# Patient Record
Sex: MALE | Race: BLACK OR AFRICAN AMERICAN | Employment: FULL TIME | URBAN - METROPOLITAN AREA
[De-identification: names, ages, dates, MRNs, and addresses within clinical notes are randomized per-mention and may not be internally consistent; named-entity substitution may affect disease eponyms.]

---

## 2019-04-18 ENCOUNTER — HOSPITAL ENCOUNTER (OUTPATIENT)
Age: 39
Discharge: HOME | End: 2019-04-18
Payer: COMMERCIAL

## 2019-04-18 ENCOUNTER — HOSPITAL ENCOUNTER (OUTPATIENT)
Dept: GENERAL RADIOLOGY | Age: 39
Discharge: HOME | End: 2019-04-18
Payer: COMMERCIAL

## 2019-04-18 DIAGNOSIS — M24.9 DERANGEMENT OF RIGHT SHOULDER JOINT: ICD-10-CM

## 2019-04-18 PROCEDURE — 73030 X-RAY EXAM OF SHOULDER: CPT

## 2022-07-25 ENCOUNTER — OFFICE VISIT (OUTPATIENT)
Dept: FAMILY MEDICINE CLINIC | Age: 42
End: 2022-07-25
Payer: COMMERCIAL

## 2022-07-25 VITALS
BODY MASS INDEX: 37.13 KG/M2 | DIASTOLIC BLOOD PRESSURE: 123 MMHG | HEART RATE: 80 BPM | WEIGHT: 245 LBS | HEIGHT: 68 IN | SYSTOLIC BLOOD PRESSURE: 192 MMHG

## 2022-07-25 DIAGNOSIS — I10 PRIMARY HYPERTENSION: ICD-10-CM

## 2022-07-25 DIAGNOSIS — Z00.00 ANNUAL PHYSICAL EXAM: Primary | ICD-10-CM

## 2022-07-25 DIAGNOSIS — Z23 NEED FOR VACCINATION: ICD-10-CM

## 2022-07-25 LAB
A/G RATIO: 1.6 (ref 1.1–2.2)
ALBUMIN SERPL-MCNC: 4.7 G/DL (ref 3.4–5)
ALP BLD-CCNC: 55 U/L (ref 40–129)
ALT SERPL-CCNC: 33 U/L (ref 10–40)
ANION GAP SERPL CALCULATED.3IONS-SCNC: 14 MMOL/L (ref 3–16)
AST SERPL-CCNC: 24 U/L (ref 15–37)
BASOPHILS ABSOLUTE: 0 K/UL (ref 0–0.2)
BASOPHILS RELATIVE PERCENT: 0.9 %
BILIRUB SERPL-MCNC: 0.6 MG/DL (ref 0–1)
BUN BLDV-MCNC: 12 MG/DL (ref 7–20)
CALCIUM SERPL-MCNC: 10 MG/DL (ref 8.3–10.6)
CHLORIDE BLD-SCNC: 104 MMOL/L (ref 99–110)
CHOLESTEROL, FASTING: 207 MG/DL (ref 0–199)
CO2: 24 MMOL/L (ref 21–32)
CREAT SERPL-MCNC: 0.8 MG/DL (ref 0.9–1.3)
EOSINOPHILS ABSOLUTE: 0.1 K/UL (ref 0–0.6)
EOSINOPHILS RELATIVE PERCENT: 1.1 %
GFR AFRICAN AMERICAN: >60
GFR NON-AFRICAN AMERICAN: >60
GLUCOSE BLD-MCNC: 101 MG/DL (ref 70–99)
HCT VFR BLD CALC: 42.8 % (ref 40.5–52.5)
HDLC SERPL-MCNC: 49 MG/DL (ref 40–60)
HEMOGLOBIN: 14.4 G/DL (ref 13.5–17.5)
HEPATITIS C ANTIBODY INTERPRETATION: NORMAL
LDL CHOLESTEROL CALCULATED: 113 MG/DL
LYMPHOCYTES ABSOLUTE: 1.6 K/UL (ref 1–5.1)
LYMPHOCYTES RELATIVE PERCENT: 33.5 %
MCH RBC QN AUTO: 28 PG (ref 26–34)
MCHC RBC AUTO-ENTMCNC: 33.7 G/DL (ref 31–36)
MCV RBC AUTO: 83 FL (ref 80–100)
MONOCYTES ABSOLUTE: 0.4 K/UL (ref 0–1.3)
MONOCYTES RELATIVE PERCENT: 7.7 %
NEUTROPHILS ABSOLUTE: 2.8 K/UL (ref 1.7–7.7)
NEUTROPHILS RELATIVE PERCENT: 56.8 %
PDW BLD-RTO: 14.5 % (ref 12.4–15.4)
PLATELET # BLD: 212 K/UL (ref 135–450)
PMV BLD AUTO: 9.6 FL (ref 5–10.5)
POTASSIUM SERPL-SCNC: 4.3 MMOL/L (ref 3.5–5.1)
RBC # BLD: 5.16 M/UL (ref 4.2–5.9)
SODIUM BLD-SCNC: 142 MMOL/L (ref 136–145)
TOTAL PROTEIN: 7.6 G/DL (ref 6.4–8.2)
TRIGLYCERIDE, FASTING: 227 MG/DL (ref 0–150)
TSH REFLEX: 0.85 UIU/ML (ref 0.27–4.2)
VLDLC SERPL CALC-MCNC: 45 MG/DL
WBC # BLD: 4.9 K/UL (ref 4–11)

## 2022-07-25 PROCEDURE — 90471 IMMUNIZATION ADMIN: CPT | Performed by: FAMILY MEDICINE

## 2022-07-25 PROCEDURE — 36415 COLL VENOUS BLD VENIPUNCTURE: CPT | Performed by: FAMILY MEDICINE

## 2022-07-25 PROCEDURE — 99386 PREV VISIT NEW AGE 40-64: CPT | Performed by: FAMILY MEDICINE

## 2022-07-25 PROCEDURE — 90715 TDAP VACCINE 7 YRS/> IM: CPT | Performed by: FAMILY MEDICINE

## 2022-07-25 RX ORDER — CHLORTHALIDONE 50 MG/1
50 TABLET ORAL DAILY
Qty: 30 TABLET | Refills: 3 | Status: SHIPPED | OUTPATIENT
Start: 2022-07-25 | End: 2022-08-22 | Stop reason: SDUPTHER

## 2022-07-25 SDOH — HEALTH STABILITY: PHYSICAL HEALTH: ON AVERAGE, HOW MANY MINUTES DO YOU ENGAGE IN EXERCISE AT THIS LEVEL?: 50 MIN

## 2022-07-25 SDOH — ECONOMIC STABILITY: FOOD INSECURITY: WITHIN THE PAST 12 MONTHS, THE FOOD YOU BOUGHT JUST DIDN'T LAST AND YOU DIDN'T HAVE MONEY TO GET MORE.: NEVER TRUE

## 2022-07-25 SDOH — HEALTH STABILITY: PHYSICAL HEALTH: ON AVERAGE, HOW MANY DAYS PER WEEK DO YOU ENGAGE IN MODERATE TO STRENUOUS EXERCISE (LIKE A BRISK WALK)?: 4 DAYS

## 2022-07-25 SDOH — ECONOMIC STABILITY: FOOD INSECURITY: WITHIN THE PAST 12 MONTHS, YOU WORRIED THAT YOUR FOOD WOULD RUN OUT BEFORE YOU GOT MONEY TO BUY MORE.: NEVER TRUE

## 2022-07-25 ASSESSMENT — PATIENT HEALTH QUESTIONNAIRE - PHQ9
SUM OF ALL RESPONSES TO PHQ QUESTIONS 1-9: 0
SUM OF ALL RESPONSES TO PHQ QUESTIONS 1-9: 0
2. FEELING DOWN, DEPRESSED OR HOPELESS: 0
SUM OF ALL RESPONSES TO PHQ QUESTIONS 1-9: 0
SUM OF ALL RESPONSES TO PHQ QUESTIONS 1-9: 0
1. LITTLE INTEREST OR PLEASURE IN DOING THINGS: 0
SUM OF ALL RESPONSES TO PHQ9 QUESTIONS 1 & 2: 0

## 2022-07-25 ASSESSMENT — SOCIAL DETERMINANTS OF HEALTH (SDOH)
WITHIN THE LAST YEAR, HAVE YOU BEEN KICKED, HIT, SLAPPED, OR OTHERWISE PHYSICALLY HURT BY YOUR PARTNER OR EX-PARTNER?: NO
WITHIN THE LAST YEAR, HAVE YOU BEEN AFRAID OF YOUR PARTNER OR EX-PARTNER?: NO
WITHIN THE LAST YEAR, HAVE TO BEEN RAPED OR FORCED TO HAVE ANY KIND OF SEXUAL ACTIVITY BY YOUR PARTNER OR EX-PARTNER?: NO
HOW HARD IS IT FOR YOU TO PAY FOR THE VERY BASICS LIKE FOOD, HOUSING, MEDICAL CARE, AND HEATING?: NOT HARD AT ALL
WITHIN THE LAST YEAR, HAVE YOU BEEN HUMILIATED OR EMOTIONALLY ABUSED IN OTHER WAYS BY YOUR PARTNER OR EX-PARTNER?: NO

## 2022-07-25 NOTE — PROGRESS NOTES
annual exam.  Update Tdap today. Check routine labs. Pneumovax up-to-date. Patient has hypertension. Blood pressure is above goal.  We will add chlorthalidone. Follow-up in 1 week for blood pressure check. No sign of ACS or endorgan damage at this time. Discussed with patient medication/s dosage, instructions, potential S/E, A/R and Drug Interaction  Tylenol or Motrin as needed for pain or fever  Advise to return here if worse or go to nearest ER  Encourage fluids  Pt discharged in stable condition. 1. Annual physical exam  -     CBC with Auto Differential; Future  -     Comprehensive Metabolic Panel; Future  -     Hemoglobin A1C; Future  -     Hepatitis C Antibody; Future  -     HIV Screen; Future  -     Lipid, Fasting; Future  -     TSH with Reflex; Future  2. Primary hypertension  -     chlorthalidone (HYGROTEN) 50 MG tablet; Take 1 tablet by mouth in the morning., Disp-30 tablet, R-3Normal     Orders Placed This Encounter   Procedures    CBC with Auto Differential     Standing Status:   Future     Standing Expiration Date:   7/25/2023    Comprehensive Metabolic Panel     Standing Status:   Future     Standing Expiration Date:   7/25/2023    Hemoglobin A1C     Standing Status:   Future     Standing Expiration Date:   7/25/2023    Hepatitis C Antibody     Standing Status:   Future     Standing Expiration Date:   7/25/2023    HIV Screen     Standing Status:   Future     Standing Expiration Date:   7/25/2023    Lipid, Fasting     Standing Status:   Future     Standing Expiration Date:   7/25/2023    TSH with Reflex     Standing Status:   Future     Standing Expiration Date:   7/25/2023       Return in about 1 week (around 8/1/2022) for blood pressure check.     Mario Garcia MD, MD    7/25/2022  4:26 PM

## 2022-07-26 LAB
ESTIMATED AVERAGE GLUCOSE: 119.8 MG/DL
HBA1C MFR BLD: 5.8 %
HIV AG/AB: NORMAL
HIV ANTIGEN: NORMAL
HIV-1 ANTIBODY: NORMAL
HIV-2 AB: NORMAL

## 2022-08-08 ENCOUNTER — OFFICE VISIT (OUTPATIENT)
Dept: FAMILY MEDICINE CLINIC | Age: 42
End: 2022-08-08
Payer: COMMERCIAL

## 2022-08-08 VITALS
DIASTOLIC BLOOD PRESSURE: 98 MMHG | HEIGHT: 68 IN | SYSTOLIC BLOOD PRESSURE: 152 MMHG | WEIGHT: 256 LBS | HEART RATE: 86 BPM | OXYGEN SATURATION: 98 % | BODY MASS INDEX: 38.8 KG/M2

## 2022-08-08 DIAGNOSIS — I10 PRIMARY HYPERTENSION: Primary | ICD-10-CM

## 2022-08-08 PROCEDURE — 99214 OFFICE O/P EST MOD 30 MIN: CPT | Performed by: FAMILY MEDICINE

## 2022-08-08 RX ORDER — LISINOPRIL 20 MG/1
20 TABLET ORAL DAILY
Qty: 90 TABLET | Refills: 1 | Status: SHIPPED | OUTPATIENT
Start: 2022-08-08 | End: 2022-08-22

## 2022-08-08 RX ORDER — HYDROCHLOROTHIAZIDE 25 MG/1
25 TABLET ORAL EVERY MORNING
Qty: 90 TABLET | Refills: 1 | Status: CANCELLED | OUTPATIENT
Start: 2022-08-08

## 2022-08-08 NOTE — PROGRESS NOTES
[address] Note    Date: 8/8/2022                                               Jennifer Quiet:     Chief Complaint   Patient presents with    Hypertension       HPI  Patient is here for blood pressure check. Currently takes chlorthalidone. Denies chest pain or shortness of breath. Patient Active Problem List    Diagnosis Date Noted    Hypertension 01/22/2013       Past Medical History:   Diagnosis Date    Hypertension 1/22/2013       Current Outpatient Medications   Medication Sig Dispense Refill    lisinopril (PRINIVIL;ZESTRIL) 20 MG tablet Take 1 tablet by mouth in the morning. 90 tablet 1    chlorthalidone (HYGROTEN) 50 MG tablet Take 1 tablet by mouth in the morning. 30 tablet 3     No current facility-administered medications for this visit. No Known Allergies    Review of Systems  No fever, no cough    Vitals:  BP (!) 152/98   Pulse 86   Ht 5' 8\" (1.727 m)   Wt 256 lb (116.1 kg)   SpO2 98%   BMI 38.92 kg/m²     Wt Readings from Last 3 Encounters:   08/08/22 256 lb (116.1 kg)   07/25/22 245 lb (111.1 kg)   01/22/13 224 lb (101.6 kg)        Physical Exam  General:  Well-appearing, NAD, alert, non-toxic  HEENT:  Normocephalic, atraumatic. Pupils equal and round. CHEST/LUNGS: CTAB, no crackles, no wheeze, no rhonchi. Symmetric rise  CARDIOVASCULAR: RRR,  no murmur, no rub  EXTREMETIES: Normal movement of all extremities  SKIN:  No rash, no cellulitis, no bruising, no petechiae/purpura/vesicles/pustules/abscess  PSYCH:  A+O x 3; normal affect  NEURO:  GCS 15, CN2-12 grossly intact, no focal motor/sensory deficits, no cerebellar deficits, normal gait, normal speech      Assessment/Plan     79-year-old male with hypertension. Blood pressures improved, but still above goal.  We will add lisinopril. Follow-up in 2 weeks for blood pressure check. Continue chlorthalidone.     Discussed with patient medication/s dosage, instructions, potential S/E, A/R and Drug Interaction  Tylenol or Motrin as needed for pain or fever  Advise to return here if worse or go to nearest ER  Encourage fluids  Pt discharged in stable condition at 16:37      1. Primary hypertension  -     lisinopril (PRINIVIL;ZESTRIL) 20 MG tablet; Take 1 tablet by mouth in the morning., Disp-90 tablet, R-1Normal     No orders of the defined types were placed in this encounter. Return in about 2 weeks (around 8/22/2022) for blood pressure check.     Conner Mccormick MD, MD    8/8/2022  4:37 PM

## 2022-08-22 ENCOUNTER — OFFICE VISIT (OUTPATIENT)
Dept: FAMILY MEDICINE CLINIC | Age: 42
End: 2022-08-22
Payer: COMMERCIAL

## 2022-08-22 VITALS
DIASTOLIC BLOOD PRESSURE: 82 MMHG | OXYGEN SATURATION: 98 % | BODY MASS INDEX: 37.53 KG/M2 | WEIGHT: 247.6 LBS | HEIGHT: 68 IN | SYSTOLIC BLOOD PRESSURE: 130 MMHG | HEART RATE: 67 BPM

## 2022-08-22 DIAGNOSIS — I10 HYPERTENSION, ESSENTIAL: Primary | ICD-10-CM

## 2022-08-22 DIAGNOSIS — I10 PRIMARY HYPERTENSION: ICD-10-CM

## 2022-08-22 PROCEDURE — 99214 OFFICE O/P EST MOD 30 MIN: CPT | Performed by: FAMILY MEDICINE

## 2022-08-22 RX ORDER — CHLORTHALIDONE 50 MG/1
50 TABLET ORAL DAILY
Qty: 90 TABLET | Refills: 1 | Status: SHIPPED | OUTPATIENT
Start: 2022-08-22 | End: 2022-09-26

## 2022-08-22 RX ORDER — LOSARTAN POTASSIUM 50 MG/1
50 TABLET ORAL DAILY
Qty: 30 TABLET | Refills: 5 | Status: SHIPPED | OUTPATIENT
Start: 2022-08-22

## 2022-08-22 ASSESSMENT — PATIENT HEALTH QUESTIONNAIRE - PHQ9
SUM OF ALL RESPONSES TO PHQ QUESTIONS 1-9: 0
SUM OF ALL RESPONSES TO PHQ QUESTIONS 1-9: 0
1. LITTLE INTEREST OR PLEASURE IN DOING THINGS: 0
SUM OF ALL RESPONSES TO PHQ9 QUESTIONS 1 & 2: 0
SUM OF ALL RESPONSES TO PHQ QUESTIONS 1-9: 0
SUM OF ALL RESPONSES TO PHQ QUESTIONS 1-9: 0
2. FEELING DOWN, DEPRESSED OR HOPELESS: 0

## 2022-08-22 NOTE — PROGRESS NOTES
losartan. Follow-up in 1 month for blood pressure check. Continue chlorthalidone. Discussed with patient medication/s dosage, instructions, potential S/E, A/R and Drug Interaction  Advise to return here if worse or go to nearest ER  Encourage fluids  Pt discharged in stable condition at 11:51      1. Hypertension, essential  -     losartan (COZAAR) 50 MG tablet; Take 1 tablet by mouth daily, Disp-30 tablet, R-5Normal  2. Primary hypertension  -     chlorthalidone (HYGROTEN) 50 MG tablet; Take 1 tablet by mouth daily Take 1 tablet by mouth in the morning., Disp-90 tablet, R-1Normal     No orders of the defined types were placed in this encounter. No follow-ups on file.     Romi Weeks MD, MD    8/22/2022  11:55 AM

## 2022-09-26 ENCOUNTER — OFFICE VISIT (OUTPATIENT)
Dept: FAMILY MEDICINE CLINIC | Age: 42
End: 2022-09-26
Payer: COMMERCIAL

## 2022-09-26 VITALS
SYSTOLIC BLOOD PRESSURE: 188 MMHG | OXYGEN SATURATION: 99 % | HEIGHT: 68 IN | WEIGHT: 245.3 LBS | HEART RATE: 97 BPM | DIASTOLIC BLOOD PRESSURE: 110 MMHG | BODY MASS INDEX: 37.18 KG/M2

## 2022-09-26 DIAGNOSIS — I10 HYPERTENSION, ESSENTIAL: Primary | ICD-10-CM

## 2022-09-26 PROCEDURE — 99214 OFFICE O/P EST MOD 30 MIN: CPT | Performed by: FAMILY MEDICINE

## 2022-09-26 RX ORDER — TRIAMTERENE AND HYDROCHLOROTHIAZIDE 37.5; 25 MG/1; MG/1
1 TABLET ORAL DAILY
Qty: 90 TABLET | Refills: 1 | Status: SHIPPED | OUTPATIENT
Start: 2022-09-26

## 2022-09-26 ASSESSMENT — PATIENT HEALTH QUESTIONNAIRE - PHQ9
SUM OF ALL RESPONSES TO PHQ QUESTIONS 1-9: 0
2. FEELING DOWN, DEPRESSED OR HOPELESS: 0
1. LITTLE INTEREST OR PLEASURE IN DOING THINGS: 0
SUM OF ALL RESPONSES TO PHQ QUESTIONS 1-9: 0
SUM OF ALL RESPONSES TO PHQ QUESTIONS 1-9: 0
SUM OF ALL RESPONSES TO PHQ9 QUESTIONS 1 & 2: 0
SUM OF ALL RESPONSES TO PHQ QUESTIONS 1-9: 0

## 2022-09-26 NOTE — PROGRESS NOTES
[address] Note    Date: 9/26/2022                                               Renay Fontaine:     Chief Complaint   Patient presents with    Blood Pressure Check       HPI  Patient is here for blood pressure check. Currently takes losartan. Stopped taking Chlorthalidone because it was causing ED and dizziness. Denies chest pain or shortness of breath. Patient Active Problem List    Diagnosis Date Noted    Hypertension 01/22/2013       Past Medical History:   Diagnosis Date    Hypertension 1/22/2013       Current Outpatient Medications   Medication Sig Dispense Refill    triamterene-hydroCHLOROthiazide (MAXZIDE-25) 37.5-25 MG per tablet Take 1 tablet by mouth daily 90 tablet 1    losartan (COZAAR) 50 MG tablet Take 1 tablet by mouth daily 30 tablet 5     No current facility-administered medications for this visit. Allergies   Allergen Reactions    Lisinopril Cough       Review of Systems  No fever, no cough, no HA    Vitals:  BP (!) 188/110 Comment: taken twice  Pulse 97   Ht 5' 7.5\" (1.715 m)   Wt 245 lb 4.8 oz (111.3 kg)   SpO2 99%   BMI 37.85 kg/m²     Wt Readings from Last 3 Encounters:   09/26/22 245 lb 4.8 oz (111.3 kg)   08/22/22 247 lb 9.6 oz (112.3 kg)   08/08/22 256 lb (116.1 kg)        Physical Exam  General:  Well-appearing, NAD, alert, non-toxic  HEENT:  Normocephalic, atraumatic. CHEST/LUNGS: No dyspnea  EXTREMETIES: Normal movement of all extremities. No edema. No joint swelling. SKIN:  No rash, no cellulitis, no bruising, no petechiae/purpura/vesicles/pustules/abscess  PSYCH:  A+O x 3; normal affect  NEURO:  GCS 15, CN2-12 grossly intact, no focal motor/sensory deficits, normal gait, normal speech      Assessment/Plan     28-year-old male with hypertension. Blood pressure is above goal.  Cannot tolerate chlorthalidone due to side effects, so we will discontinue this. We will start him on Maxide. Continue losartan.   Follow-up in 2 weeks for blood pressure check. Discussed with patient medication/s dosage, instructions, potential S/E, A/R and Drug Interaction  Tylenol or Motrin as needed for pain or fever  Advise to return here if worse or go to nearest ER  Encourage fluids  Pt discharged in stable condition at 11:25      1. Hypertension, essential  -     triamterene-hydroCHLOROthiazide (MAXZIDE-25) 37.5-25 MG per tablet; Take 1 tablet by mouth daily, Disp-90 tablet, R-1Normal     No orders of the defined types were placed in this encounter. Return in about 2 weeks (around 10/10/2022) for blood pressure check.     Ryan Blakely MD, MD    9/26/2022  11:24 AM

## 2022-12-02 ENCOUNTER — OFFICE VISIT (OUTPATIENT)
Dept: FAMILY MEDICINE CLINIC | Age: 42
End: 2022-12-02
Payer: COMMERCIAL

## 2022-12-02 VITALS
TEMPERATURE: 97.2 F | WEIGHT: 256.4 LBS | BODY MASS INDEX: 39.57 KG/M2 | OXYGEN SATURATION: 97 % | SYSTOLIC BLOOD PRESSURE: 110 MMHG | DIASTOLIC BLOOD PRESSURE: 80 MMHG | HEART RATE: 86 BPM

## 2022-12-02 DIAGNOSIS — M54.50 ACUTE BILATERAL LOW BACK PAIN WITHOUT SCIATICA: Primary | ICD-10-CM

## 2022-12-02 DIAGNOSIS — I10 ESSENTIAL HYPERTENSION: ICD-10-CM

## 2022-12-02 DIAGNOSIS — M62.838 MUSCLE SPASM: ICD-10-CM

## 2022-12-02 PROCEDURE — 99213 OFFICE O/P EST LOW 20 MIN: CPT | Performed by: CLINICAL NURSE SPECIALIST

## 2022-12-02 PROCEDURE — 3079F DIAST BP 80-89 MM HG: CPT | Performed by: CLINICAL NURSE SPECIALIST

## 2022-12-02 PROCEDURE — 3074F SYST BP LT 130 MM HG: CPT | Performed by: CLINICAL NURSE SPECIALIST

## 2022-12-02 RX ORDER — NAPROXEN 500 MG/1
500 TABLET ORAL 2 TIMES DAILY WITH MEALS
Qty: 60 TABLET | Refills: 0 | Status: SHIPPED | OUTPATIENT
Start: 2022-12-02

## 2022-12-02 RX ORDER — CYCLOBENZAPRINE HCL 10 MG
10 TABLET ORAL 2 TIMES DAILY PRN
Qty: 20 TABLET | Refills: 0 | Status: SHIPPED | OUTPATIENT
Start: 2022-12-02 | End: 2022-12-12

## 2022-12-02 ASSESSMENT — ENCOUNTER SYMPTOMS
ABDOMINAL PAIN: 0
COUGH: 0
BACK PAIN: 1
SHORTNESS OF BREATH: 0
CONSTIPATION: 0
NAUSEA: 0
DIARRHEA: 0
WHEEZING: 0
CHEST TIGHTNESS: 0
BOWEL INCONTINENCE: 0
VOMITING: 0

## 2022-12-02 NOTE — PATIENT INSTRUCTIONS
Naproxen twice a day as need for pain (take with food)    No Aleve, Advil, Ibuprofen, Motrin or aspirin while taking the naproxen. Watch for GI symptoms (heart burn, indigestion, epigastric pain or blood in stool)    Stretches/exercises given. Heat or ice, whichever feels better.     Cyclobenzaprine 5-10 mg (half to full) twice daily as needed for muscle spasms, may cause drowsiness    Follow-up with PCP in 4 to 6 weeks, sooner if symptoms worsen or persist

## 2022-12-02 NOTE — PROGRESS NOTES
SUBJECTIVE:    Patient ID:  Cipriano Holloway is a 36 y.o. male      Patient is here for an acute overflow for complaints of lower back pain since yesterday. States he has had a similar instance about 8 months ago. States his office chair is currently broken and has no back support. Back Pain  This is a recurrent (last time was about 8 months) problem. The current episode started yesterday. The problem occurs constantly. The problem has been waxing and waning since onset. The pain is present in the lumbar spine. The quality of the pain is described as aching and shooting. The pain does not radiate. Pain scale: 5-9. The pain is Worse during the day. Pertinent negatives include no abdominal pain, bladder incontinence, bowel incontinence, chest pain, dysuria, fever, headaches, leg pain, numbness, paresis, pelvic pain, perianal numbness, weakness or weight loss. Risk factors include obesity. He has tried NSAIDs (massage done) for the symptoms. Current Outpatient Medications on File Prior to Visit   Medication Sig Dispense Refill    triamterene-hydroCHLOROthiazide (MAXZIDE-25) 37.5-25 MG per tablet Take 1 tablet by mouth daily 90 tablet 1    losartan (COZAAR) 50 MG tablet Take 1 tablet by mouth daily 30 tablet 5     No current facility-administered medications on file prior to visit. Past Medical History:   Diagnosis Date    Hypertension 1/22/2013     No past surgical history on file.   Family History   Problem Relation Age of Onset    Cancer Father     Asthma Brother      Social History     Socioeconomic History    Marital status:      Spouse name: Not on file    Number of children: Not on file    Years of education: Not on file    Highest education level: Not on file   Occupational History    Not on file   Tobacco Use    Smoking status: Every Day     Packs/day: 0.50     Years: 10.00     Pack years: 5.00     Types: Cigarettes    Smokeless tobacco: Never   Vaping Use    Vaping Use: Every day Substances: Nicotine   Substance and Sexual Activity    Alcohol use: Yes     Alcohol/week: 7.0 standard drinks     Types: 7 Cans of beer per week    Drug use: Yes    Sexual activity: Yes     Partners: Female   Other Topics Concern    Not on file   Social History Narrative    Not on file     Social Determinants of Health     Financial Resource Strain: Low Risk     Difficulty of Paying Living Expenses: Not hard at all   Food Insecurity: No Food Insecurity    Worried About Running Out of Food in the Last Year: Never true    [address] in the Last Year: Never true   Transportation Needs: Not on file   Physical Activity: Sufficiently Active    Days of Exercise per Week: 4 days    Minutes of Exercise per Session: 50 min   Stress: Not on file   Social Connections: Not on file   Intimate Partner Violence: Not At Risk    Fear of Current or Ex-Partner: No    Emotionally Abused: No    Physically Abused: No    Sexually Abused: No   Housing Stability: Not on file       Review of Systems   Constitutional:  Negative for chills, fever and weight loss. Eyes:  Negative for visual disturbance. Respiratory:  Negative for cough, chest tightness, shortness of breath and wheezing. Cardiovascular:  Negative for chest pain, palpitations and leg swelling. Gastrointestinal:  Negative for abdominal pain, bowel incontinence, constipation, diarrhea, nausea and vomiting. Genitourinary:  Negative for bladder incontinence, dysuria and pelvic pain. Musculoskeletal:  Positive for back pain. Negative for arthralgias and myalgias. Skin:  Negative for rash. Neurological:  Negative for weakness, numbness and headaches. OBJECTIVE:    Physical Exam  Vitals and nursing note reviewed. Constitutional:       General: He is not in acute distress. Appearance: He is well-developed. He is not ill-appearing. HENT:      Head: Normocephalic and atraumatic.       Right Ear: External ear normal.      Left Ear: External ear normal. Nose: Nose normal.      Mouth/Throat:      Mouth: Mucous membranes are moist.   Eyes:      Conjunctiva/sclera: Conjunctivae normal.      Pupils: Pupils are equal, round, and reactive to light. Neck:      Vascular: No carotid bruit. Trachea: No tracheal deviation. Cardiovascular:      Rate and Rhythm: Normal rate and regular rhythm. Heart sounds: Normal heart sounds. Pulmonary:      Effort: Pulmonary effort is normal. No respiratory distress. Breath sounds: Normal breath sounds. No wheezing or rales. Chest:      Chest wall: No tenderness. Abdominal:      General: Bowel sounds are normal. There is no distension. Palpations: Abdomen is soft. There is no mass. Tenderness: There is no abdominal tenderness. Hernia: No hernia is present. Musculoskeletal:         General: Normal range of motion. Cervical back: Normal range of motion and neck supple. Right lower leg: No edema. Left lower leg: No edema. Comments: Slight decreased in range of motion due to discomfort, mainly with extension and lateral movements, negative straight leg raise, patellar DTRs intact, no spinal tenderness with palpation, muscle tightness appreciated   Skin:     General: Skin is warm and dry. Capillary Refill: Capillary refill takes less than 2 seconds. Findings: No rash. Neurological:      Mental Status: He is alert and oriented to person, place, and time. Psychiatric:         Behavior: Behavior normal.     /80   Pulse 86   Temp 97.2 °F (36.2 °C)   Wt 256 lb 6.4 oz (116.3 kg)   SpO2 97%   BMI 39.57 kg/m²    BP Readings from Last 3 Encounters:   12/02/22 110/80   09/26/22 (!) 188/110   08/22/22 130/82      Wt Readings from Last 3 Encounters:   12/02/22 256 lb 6.4 oz (116.3 kg)   09/26/22 245 lb 4.8 oz (111.3 kg)   08/22/22 247 lb 9.6 oz (112.3 kg)       ASSESSMENT & PLAN:    1.  Acute bilateral low back pain without sciatica  - naproxen (NAPROSYN) 500 MG tablet; Take 1 tablet by mouth 2 times daily (with meals)  Dispense: 60 tablet; Refill: 0  - Naproxen twice a day as need for pain (take with food)  - No Aleve, Advil, Ibuprofen, Motrin or aspirin while taking the naproxen. - Watch for GI symptoms (heart burn, indigestion, epigastric pain or blood in stool)  - Stretches/exercises given. - Heat or ice, whichever feels better. - Follow-up with PCP in 4 to 6 weeks, sooner if symptoms worsen or persist    2. Muscle spasm  - cyclobenzaprine (FLEXERIL) 10 MG tablet; Take 1 tablet by mouth 2 times daily as needed for Muscle spasms  Dispense: 20 tablet; Refill: 0  - Cyclobenzaprine 5 mg to 10 mg (half to full) twice daily as needed for muscle spasms, may cause drowsiness    3. Essential hypertension  - Stable, continue triamterene hydrochlorothiazide 37.5-25 and losartan 25 mg daily    Continue current treatment plan. Current Outpatient Medications   Medication Sig Dispense Refill    naproxen (NAPROSYN) 500 MG tablet Take 1 tablet by mouth 2 times daily (with meals) 60 tablet 0    cyclobenzaprine (FLEXERIL) 10 MG tablet Take 1 tablet by mouth 2 times daily as needed for Muscle spasms 20 tablet 0    triamterene-hydroCHLOROthiazide (MAXZIDE-25) 37.5-25 MG per tablet Take 1 tablet by mouth daily 90 tablet 1    losartan (COZAAR) 50 MG tablet Take 1 tablet by mouth daily 30 tablet 5     No current facility-administered medications for this visit. Return in about 4 weeks (around 12/30/2022), or if symptoms worsen or fail to improve, for back pain, muscle spasm, HTN. Lidia Trevino received counseling on the following healthy behaviors: nutrition, exercise, and medication adherence    Patient given educational materials on Exercise    Discussed use, benefit, and side effects of prescribed medications. Barriers to medication compliance addressed. All patient questions answered. Pt voiced understanding. Call office if experience side effects from medications.       Please note that some or all of this record was generated using voice recognition software. If there are any questions about the content of this document, please contact the author as some errors in transcription may have occurred.

## 2022-12-29 DIAGNOSIS — M54.50 ACUTE BILATERAL LOW BACK PAIN WITHOUT SCIATICA: ICD-10-CM

## 2022-12-29 RX ORDER — NAPROXEN 500 MG/1
TABLET ORAL
Qty: 60 TABLET | Refills: 0 | OUTPATIENT
Start: 2022-12-29

## 2023-02-23 DIAGNOSIS — I10 HYPERTENSION, ESSENTIAL: ICD-10-CM

## 2023-02-23 RX ORDER — LOSARTAN POTASSIUM 50 MG/1
TABLET ORAL
Qty: 90 TABLET | Refills: 0 | Status: SHIPPED | OUTPATIENT
Start: 2023-02-23

## 2023-03-20 DIAGNOSIS — I10 HYPERTENSION, ESSENTIAL: ICD-10-CM

## 2023-03-20 RX ORDER — TRIAMTERENE AND HYDROCHLOROTHIAZIDE 37.5; 25 MG/1; MG/1
TABLET ORAL
Qty: 90 TABLET | Refills: 0 | Status: SHIPPED | OUTPATIENT
Start: 2023-03-20

## 2023-05-21 DIAGNOSIS — I10 HYPERTENSION, ESSENTIAL: ICD-10-CM

## 2023-05-22 RX ORDER — LOSARTAN POTASSIUM 50 MG/1
TABLET ORAL
Qty: 90 TABLET | Refills: 0 | Status: SHIPPED | OUTPATIENT
Start: 2023-05-22

## 2023-06-22 DIAGNOSIS — I10 HYPERTENSION, ESSENTIAL: ICD-10-CM

## 2023-06-22 RX ORDER — TRIAMTERENE AND HYDROCHLOROTHIAZIDE 37.5; 25 MG/1; MG/1
TABLET ORAL
Qty: 30 TABLET | Refills: 0 | Status: SHIPPED | OUTPATIENT
Start: 2023-06-22 | End: 2023-07-25

## 2023-07-25 DIAGNOSIS — I10 HYPERTENSION, ESSENTIAL: ICD-10-CM

## 2023-07-25 RX ORDER — TRIAMTERENE AND HYDROCHLOROTHIAZIDE 37.5; 25 MG/1; MG/1
TABLET ORAL
Qty: 30 TABLET | Refills: 0 | Status: SHIPPED | OUTPATIENT
Start: 2023-07-25

## 2023-08-23 DIAGNOSIS — I10 HYPERTENSION, ESSENTIAL: ICD-10-CM

## 2023-08-23 RX ORDER — LOSARTAN POTASSIUM 50 MG/1
TABLET ORAL
Qty: 30 TABLET | Refills: 0 | Status: SHIPPED | OUTPATIENT
Start: 2023-08-23 | End: 2023-09-18

## 2023-08-29 DIAGNOSIS — I10 HYPERTENSION, ESSENTIAL: ICD-10-CM

## 2023-08-29 RX ORDER — TRIAMTERENE AND HYDROCHLOROTHIAZIDE 37.5; 25 MG/1; MG/1
TABLET ORAL
Qty: 30 TABLET | Refills: 0 | Status: SHIPPED | OUTPATIENT
Start: 2023-08-29 | End: 2023-10-02

## 2023-09-16 DIAGNOSIS — I10 HYPERTENSION, ESSENTIAL: ICD-10-CM

## 2023-09-18 RX ORDER — LOSARTAN POTASSIUM 50 MG/1
TABLET ORAL
Qty: 30 TABLET | Refills: 0 | Status: SHIPPED | OUTPATIENT
Start: 2023-09-18

## 2023-09-30 DIAGNOSIS — I10 HYPERTENSION, ESSENTIAL: ICD-10-CM

## 2023-10-02 RX ORDER — TRIAMTERENE AND HYDROCHLOROTHIAZIDE 37.5; 25 MG/1; MG/1
TABLET ORAL
Qty: 30 TABLET | Refills: 0 | Status: SHIPPED | OUTPATIENT
Start: 2023-10-02

## 2023-10-02 NOTE — TELEPHONE ENCOUNTER
Medication:   Requested Prescriptions     Pending Prescriptions Disp Refills    triamterene-hydroCHLOROthiazide (MAXZIDE-25) 37.5-25 MG per tablet [Pharmacy Med Name: Hailey Bojorquez 37.5-25 MG TB] 30 tablet 0     Sig: TAKE 1 TABLET BY MOUTH EVERY DAY       Last Filled:  8/29/2023, 30, 0    Patient Phone Number: [phone] (home)     Last appt: 9/26/2022   Next appt: Visit date not found    Lab Results   Component Value Date     07/25/2022    K 4.3 07/25/2022     07/25/2022    CO2 24 07/25/2022    BUN 12 07/25/2022    CREATININE 0.8 (L) 07/25/2022    GLUCOSE 101 (H) 07/25/2022    CALCIUM 10.0 07/25/2022    PROT 7.6 07/25/2022    LABALBU 4.7 07/25/2022    BILITOT 0.6 07/25/2022    ALKPHOS 55 07/25/2022    AST 24 07/25/2022    ALT 33 07/25/2022    LABGLOM >60 07/25/2022    GFRAA >60 07/25/2022    AGRATIO 1.6 07/25/2022

## 2023-10-09 ENCOUNTER — OFFICE VISIT (OUTPATIENT)
Dept: FAMILY MEDICINE CLINIC | Age: 43
End: 2023-10-09
Payer: COMMERCIAL

## 2023-10-09 VITALS
HEIGHT: 68 IN | HEART RATE: 94 BPM | BODY MASS INDEX: 39.86 KG/M2 | WEIGHT: 263 LBS | OXYGEN SATURATION: 97 % | TEMPERATURE: 98.2 F | DIASTOLIC BLOOD PRESSURE: 95 MMHG | RESPIRATION RATE: 16 BRPM | SYSTOLIC BLOOD PRESSURE: 160 MMHG

## 2023-10-09 DIAGNOSIS — I10 HYPERTENSION, ESSENTIAL: ICD-10-CM

## 2023-10-09 PROCEDURE — 3078F DIAST BP <80 MM HG: CPT | Performed by: FAMILY MEDICINE

## 2023-10-09 PROCEDURE — 3077F SYST BP >= 140 MM HG: CPT | Performed by: FAMILY MEDICINE

## 2023-10-09 PROCEDURE — 99214 OFFICE O/P EST MOD 30 MIN: CPT | Performed by: FAMILY MEDICINE

## 2023-10-09 RX ORDER — TRIAMTERENE AND HYDROCHLOROTHIAZIDE 75; 50 MG/1; MG/1
1 TABLET ORAL DAILY
Qty: 90 TABLET | Refills: 1 | Status: SHIPPED | OUTPATIENT
Start: 2023-10-09

## 2023-10-09 SDOH — ECONOMIC STABILITY: FOOD INSECURITY: WITHIN THE PAST 12 MONTHS, YOU WORRIED THAT YOUR FOOD WOULD RUN OUT BEFORE YOU GOT MONEY TO BUY MORE.: NEVER TRUE

## 2023-10-09 SDOH — ECONOMIC STABILITY: FOOD INSECURITY: WITHIN THE PAST 12 MONTHS, THE FOOD YOU BOUGHT JUST DIDN'T LAST AND YOU DIDN'T HAVE MONEY TO GET MORE.: NEVER TRUE

## 2023-10-09 SDOH — ECONOMIC STABILITY: TRANSPORTATION INSECURITY
IN THE PAST 12 MONTHS, HAS LACK OF TRANSPORTATION KEPT YOU FROM MEETINGS, WORK, OR FROM GETTING THINGS NEEDED FOR DAILY LIVING?: NO

## 2023-10-09 SDOH — ECONOMIC STABILITY: HOUSING INSECURITY
IN THE LAST 12 MONTHS, WAS THERE A TIME WHEN YOU DID NOT HAVE A STEADY PLACE TO SLEEP OR SLEPT IN A SHELTER (INCLUDING NOW)?: NO

## 2023-10-09 SDOH — ECONOMIC STABILITY: INCOME INSECURITY: HOW HARD IS IT FOR YOU TO PAY FOR THE VERY BASICS LIKE FOOD, HOUSING, MEDICAL CARE, AND HEATING?: NOT VERY HARD

## 2023-10-09 ASSESSMENT — PATIENT HEALTH QUESTIONNAIRE - PHQ9
SUM OF ALL RESPONSES TO PHQ QUESTIONS 1-9: 0
SUM OF ALL RESPONSES TO PHQ QUESTIONS 1-9: 0
1. LITTLE INTEREST OR PLEASURE IN DOING THINGS: NOT AT ALL
SUM OF ALL RESPONSES TO PHQ9 QUESTIONS 1 & 2: 0
SUM OF ALL RESPONSES TO PHQ9 QUESTIONS 1 & 2: 0
1. LITTLE INTEREST OR PLEASURE IN DOING THINGS: 0
SUM OF ALL RESPONSES TO PHQ QUESTIONS 1-9: 0
SUM OF ALL RESPONSES TO PHQ QUESTIONS 1-9: 0
2. FEELING DOWN, DEPRESSED OR HOPELESS: NOT AT ALL
2. FEELING DOWN, DEPRESSED OR HOPELESS: 0

## 2023-10-22 DIAGNOSIS — I10 HYPERTENSION, ESSENTIAL: ICD-10-CM

## 2023-10-23 RX ORDER — LOSARTAN POTASSIUM 50 MG/1
TABLET ORAL
Qty: 90 TABLET | Refills: 1 | Status: SHIPPED | OUTPATIENT
Start: 2023-10-23

## 2023-10-23 NOTE — TELEPHONE ENCOUNTER
Medication:   Requested Prescriptions     Pending Prescriptions Disp Refills    losartan (COZAAR) 50 MG tablet [Pharmacy Med Name: LOSARTAN POTASSIUM 50 MG TAB] 30 tablet 0     Sig: TAKE 1 TABLET BY MOUTH EVERY DAY       Last Filled:  9/18/2023    Patient Phone Number: [phone] (home)     Last appt: 10/9/2023   Next appt: 11/10/2023    Lab Results   Component Value Date     07/25/2022    K 4.3 07/25/2022     07/25/2022    CO2 24 07/25/2022    BUN 12 07/25/2022    CREATININE 0.8 (L) 07/25/2022    GLUCOSE 101 (H) 07/25/2022    CALCIUM 10.0 07/25/2022    PROT 7.6 07/25/2022    LABALBU 4.7 07/25/2022    BILITOT 0.6 07/25/2022    ALKPHOS 55 07/25/2022    AST 24 07/25/2022    ALT 33 07/25/2022    LABGLOM >60 07/25/2022    GFRAA >60 07/25/2022    AGRATIO 1.6 07/25/2022

## 2023-10-30 DIAGNOSIS — I10 HYPERTENSION, ESSENTIAL: ICD-10-CM

## 2023-10-30 NOTE — TELEPHONE ENCOUNTER
Medication and Quantity requested: triamterene-hydroCHLOROthiazide (MAXZIDE-25) 37.5-25 MG per tablet [[id number]       Last Visit  10/9/23    Pharmacy and phone number updated in EPIC:  yes

## 2023-10-31 RX ORDER — TRIAMTERENE AND HYDROCHLOROTHIAZIDE 75; 50 MG/1; MG/1
1 TABLET ORAL DAILY
Qty: 90 TABLET | Refills: 1 | Status: SHIPPED | OUTPATIENT
Start: 2023-10-31

## 2023-10-31 NOTE — TELEPHONE ENCOUNTER
Medication:   Requested Prescriptions     Pending Prescriptions Disp Refills    triamterene-hydroCHLOROthiazide (MAXZIDE) 75-50 MG per tablet 90 tablet 1     Sig: Take 1 tablet by mouth daily       Last Filled:  10/9/2023    Patient Phone Number: [phone] (home)     Last appt: 10/9/2023   Next appt: 11/10/2023    Lab Results   Component Value Date     07/25/2022    K 4.3 07/25/2022     07/25/2022    CO2 24 07/25/2022    BUN 12 07/25/2022    CREATININE 0.8 (L) 07/25/2022    GLUCOSE 101 (H) 07/25/2022    CALCIUM 10.0 07/25/2022    PROT 7.6 07/25/2022    LABALBU 4.7 07/25/2022    BILITOT 0.6 07/25/2022    ALKPHOS 55 07/25/2022    AST 24 07/25/2022    ALT 33 07/25/2022    LABGLOM >60 07/25/2022    GFRAA >60 07/25/2022    AGRATIO 1.6 07/25/2022

## 2023-11-02 DIAGNOSIS — I10 HYPERTENSION, ESSENTIAL: ICD-10-CM

## 2023-11-02 RX ORDER — TRIAMTERENE AND HYDROCHLOROTHIAZIDE 37.5; 25 MG/1; MG/1
TABLET ORAL
Qty: 30 TABLET | Refills: 0 | OUTPATIENT
Start: 2023-11-02

## 2023-11-02 NOTE — TELEPHONE ENCOUNTER
Medication:   Requested Prescriptions     Refused Prescriptions Disp Refills    triamterene-hydroCHLOROthiazide (BMHYMYZ-37) 37.5-25 MG per tablet [Pharmacy Med Name: Lulutriciaflorence DarlingPascual 37.5-25 MG TB] 30 tablet 0     Sig: TAKE 1 TABLET BY MOUTH EVERY DAY        Last Filled:  10/31/2023    Patient Phone Number: [phone] (home)     Last appt: 10/9/2023   Next appt: 11/10/2023    Last OARRS:        No data to display

## 2023-11-10 ENCOUNTER — OFFICE VISIT (OUTPATIENT)
Dept: FAMILY MEDICINE CLINIC | Age: 43
End: 2023-11-10
Payer: COMMERCIAL

## 2023-11-10 VITALS
OXYGEN SATURATION: 97 % | BODY MASS INDEX: 41.28 KG/M2 | SYSTOLIC BLOOD PRESSURE: 138 MMHG | HEIGHT: 67 IN | TEMPERATURE: 97.2 F | DIASTOLIC BLOOD PRESSURE: 82 MMHG | RESPIRATION RATE: 16 BRPM | WEIGHT: 263 LBS | HEART RATE: 98 BPM

## 2023-11-10 DIAGNOSIS — R73.03 PREDIABETES: ICD-10-CM

## 2023-11-10 DIAGNOSIS — Z00.00 ANNUAL PHYSICAL EXAM: Primary | ICD-10-CM

## 2023-11-10 DIAGNOSIS — I10 HYPERTENSION, ESSENTIAL: ICD-10-CM

## 2023-11-10 DIAGNOSIS — Z23 NEED FOR VACCINATION: ICD-10-CM

## 2023-11-10 PROCEDURE — 99396 PREV VISIT EST AGE 40-64: CPT | Performed by: FAMILY MEDICINE

## 2023-11-10 PROCEDURE — 3075F SYST BP GE 130 - 139MM HG: CPT | Performed by: FAMILY MEDICINE

## 2023-11-10 PROCEDURE — 3079F DIAST BP 80-89 MM HG: CPT | Performed by: FAMILY MEDICINE

## 2023-11-11 LAB
EST. AVERAGE GLUCOSE BLD GHB EST-MCNC: 122.6 MG/DL
HBA1C MFR BLD: 5.9 %

## 2024-05-01 DIAGNOSIS — I10 HYPERTENSION, ESSENTIAL: ICD-10-CM

## 2024-05-01 RX ORDER — LOSARTAN POTASSIUM 50 MG/1
TABLET ORAL
Qty: 90 TABLET | Refills: 0 | Status: SHIPPED | OUTPATIENT
Start: 2024-05-01

## 2024-05-01 NOTE — TELEPHONE ENCOUNTER
Medication:   Requested Prescriptions     Pending Prescriptions Disp Refills    losartan (COZAAR) 50 MG tablet [Pharmacy Med Name: LOSARTAN POTASSIUM 50 MG TAB] 90 tablet 1     Sig: TAKE 1 TABLET BY MOUTH EVERY DAY       Last Filled:  10/23/23    Patient Phone Number: [phone] (home)     Last appt: 11/10/2023   Next appt: Visit date not found    Lab Results   Component Value Date     07/25/2022    K 4.3 07/25/2022     07/25/2022    CO2 24 07/25/2022    BUN 12 07/25/2022    CREATININE 0.8 (L) 07/25/2022    GLUCOSE 101 (H) 07/25/2022    CALCIUM 10.0 07/25/2022    PROT 8.0 01/22/2013    BILITOT 0.6 07/25/2022    ALKPHOS 55 07/25/2022    AST 24 07/25/2022    ALT 33 07/25/2022    LABGLOM >60 07/25/2022    GFRAA >60 07/25/2022    AGRATIO 1.6 07/25/2022

## 2024-05-16 DIAGNOSIS — I10 HYPERTENSION, ESSENTIAL: ICD-10-CM

## 2024-05-16 RX ORDER — TRIAMTERENE AND HYDROCHLOROTHIAZIDE 75; 50 MG/1; MG/1
1 TABLET ORAL DAILY
Qty: 30 TABLET | Refills: 0 | Status: SHIPPED | OUTPATIENT
Start: 2024-05-16

## 2024-05-16 NOTE — TELEPHONE ENCOUNTER
Lov 11/10/23  Lrf 90 1 10/31/23 Medication:   Requested Prescriptions     Pending Prescriptions Disp Refills    triamterene-hydroCHLOROthiazide (MAXZIDE) 75-50 MG per tablet [Pharmacy Med Name: TRIAMTERENE-HCTZ 75-50 MG TAB] 90 tablet 1     Sig: TAKE 1 TABLET BY MOUTH EVERY DAY       Last Filled:      Patient Phone Number: [phone] (home)     Last appt: 11/10/2023   Next appt: Visit date not found    Lab Results   Component Value Date     07/25/2022    K 4.3 07/25/2022     07/25/2022    CO2 24 07/25/2022    BUN 12 07/25/2022    CREATININE 0.8 (L) 07/25/2022    GLUCOSE 101 (H) 07/25/2022    CALCIUM 10.0 07/25/2022    PROT 8.0 01/22/2013    BILITOT 0.6 07/25/2022    ALKPHOS 55 07/25/2022    AST 24 07/25/2022    ALT 33 07/25/2022    LABGLOM >60 07/25/2022    GFRAA >60 07/25/2022    AGRATIO 1.6 07/25/2022

## 2024-05-28 ENCOUNTER — OFFICE VISIT (OUTPATIENT)
Dept: FAMILY MEDICINE CLINIC | Age: 44
End: 2024-05-28
Payer: COMMERCIAL

## 2024-05-28 VITALS
BODY MASS INDEX: 41.22 KG/M2 | DIASTOLIC BLOOD PRESSURE: 84 MMHG | OXYGEN SATURATION: 98 % | SYSTOLIC BLOOD PRESSURE: 128 MMHG | HEART RATE: 80 BPM | WEIGHT: 263.2 LBS

## 2024-05-28 DIAGNOSIS — L98.9 SKIN LESION OF HAND: ICD-10-CM

## 2024-05-28 DIAGNOSIS — I10 HYPERTENSION, ESSENTIAL: Primary | ICD-10-CM

## 2024-05-28 PROCEDURE — 3074F SYST BP LT 130 MM HG: CPT | Performed by: FAMILY MEDICINE

## 2024-05-28 PROCEDURE — 3079F DIAST BP 80-89 MM HG: CPT | Performed by: FAMILY MEDICINE

## 2024-05-28 PROCEDURE — 99213 OFFICE O/P EST LOW 20 MIN: CPT | Performed by: FAMILY MEDICINE

## 2024-05-28 ASSESSMENT — PATIENT HEALTH QUESTIONNAIRE - PHQ9
SUM OF ALL RESPONSES TO PHQ9 QUESTIONS 1 & 2: 0
SUM OF ALL RESPONSES TO PHQ9 QUESTIONS 1 & 2: 0
SUM OF ALL RESPONSES TO PHQ QUESTIONS 1-9: 0
SUM OF ALL RESPONSES TO PHQ QUESTIONS 1-9: 0
2. FEELING DOWN, DEPRESSED OR HOPELESS: NOT AT ALL
SUM OF ALL RESPONSES TO PHQ QUESTIONS 1-9: 0
SUM OF ALL RESPONSES TO PHQ QUESTIONS 1-9: 0
1. LITTLE INTEREST OR PLEASURE IN DOING THINGS: NOT AT ALL
2. FEELING DOWN, DEPRESSED OR HOPELESS: NOT AT ALL
1. LITTLE INTEREST OR PLEASURE IN DOING THINGS: NOT AT ALL

## 2024-05-28 NOTE — PROGRESS NOTES
[facility]  Clinic Note    Date: 5/28/2024                                               /Objective:     Chief Complaint   Patient presents with    Medication Refill     Patient states he has a few bumps on his arms he would like looked at.       HPI  Patient is here for follow-up on hypertension.  Currently takes triamterene and HCTZ and losartan.  Denies chest pain or shortness of breath.    Pt has an itchy bump on L thumb that he first noticed today. Is not painful. No drainage. Stable in severity.           Patient Active Problem List    Diagnosis Date Noted    Hypertension 01/22/2013       Past Medical History:   Diagnosis Date    Hypertension 1/22/2013       Current Outpatient Medications   Medication Sig Dispense Refill    triamterene-hydroCHLOROthiazide (MAXZIDE) 75-50 MG per tablet TAKE 1 TABLET BY MOUTH EVERY DAY 30 tablet 0    losartan (COZAAR) 50 MG tablet TAKE 1 TABLET BY MOUTH EVERY DAY 90 tablet 0     No current facility-administered medications for this visit.       Allergies   Allergen Reactions    Lisinopril Cough       Review of Systems  No fever, no cough    Vitals:  /84 (Site: Right Upper Arm, Position: Sitting, Cuff Size: Large Adult)   Pulse 80   Wt 119.4 kg (263 lb 3.2 oz)   SpO2 98%   BMI 41.22 kg/m²     Wt Readings from Last 3 Encounters:   05/28/24 119.4 kg (263 lb 3.2 oz)   11/10/23 119.3 kg (263 lb)   10/09/23 119.3 kg (263 lb)        Physical Exam  General:  Well-appearing, NAD, alert, non-toxic  HEENT:  Normocephalic, atraumatic. Pupils equal and round.   CHEST/LUNGS: CTAB, no crackles, no wheeze, no rhonchi. Symmetric rise  CARDIOVASCULAR: RRR,  no murmur, no rub  EXTREMETIES: Normal movement of all extremities  SKIN: +3 mm hyperpigmented papule on the left posterior proximal phalanx of thumb.  No surrounding erythema.  No drainage noted.  No scale.  PSYCH:  A+O x 3; normal affect  NEURO:  GCS 15, CN2-12 grossly intact, no focal motor/sensory deficits, no

## 2024-06-11 DIAGNOSIS — I10 HYPERTENSION, ESSENTIAL: ICD-10-CM

## 2024-06-11 RX ORDER — TRIAMTERENE AND HYDROCHLOROTHIAZIDE 75; 50 MG/1; MG/1
1 TABLET ORAL DAILY
Qty: 90 TABLET | Refills: 1 | Status: SHIPPED | OUTPATIENT
Start: 2024-06-11

## 2024-06-11 NOTE — TELEPHONE ENCOUNTER
Medication:   Requested Prescriptions     Pending Prescriptions Disp Refills    triamterene-hydroCHLOROthiazide (MAXZIDE) 75-50 MG per tablet [Pharmacy Med Name: TRIAMTERENE-HCTZ 75-50 MG TAB] 90 tablet 1     Sig: TAKE 1 TABLET BY MOUTH EVERY DAY       Last Filled:  30 tablet 0 refills     Patient Phone Number: [phone] (home)     Last appt: 5/28/2024   Next appt: 11/29/2024    Lab Results   Component Value Date     07/25/2022    K 4.3 07/25/2022     07/25/2022    CO2 24 07/25/2022    BUN 12 07/25/2022    CREATININE 0.8 (L) 07/25/2022    GLUCOSE 101 (H) 07/25/2022    CALCIUM 10.0 07/25/2022    PROT 8.0 01/22/2013    BILITOT 0.6 07/25/2022    ALKPHOS 55 07/25/2022    AST 24 07/25/2022    ALT 33 07/25/2022    LABGLOM >60 07/25/2022    GFRAA >60 07/25/2022    AGRATIO 1.6 07/25/2022   Pt requesting 90 day supply

## 2024-08-04 DIAGNOSIS — I10 HYPERTENSION, ESSENTIAL: ICD-10-CM

## 2024-08-05 RX ORDER — LOSARTAN POTASSIUM 50 MG/1
TABLET ORAL
Qty: 90 TABLET | Refills: 1 | Status: SHIPPED | OUTPATIENT
Start: 2024-08-05

## 2024-08-05 NOTE — TELEPHONE ENCOUNTER
Medication:   Requested Prescriptions     Pending Prescriptions Disp Refills    losartan (COZAAR) 50 MG tablet [Pharmacy Med Name: LOSARTAN POTASSIUM 50 MG TAB] 90 tablet 0     Sig: TAKE 1 TABLET BY MOUTH EVERY DAY       Last Filled:  90.0  05.01.2024    Patient Phone Number: [phone] (home)     Last appt: 5/28/2024   Next appt: 11/29/2024    Lab Results   Component Value Date     07/25/2022    K 4.3 07/25/2022     07/25/2022    CO2 24 07/25/2022    BUN 12 07/25/2022    CREATININE 0.8 (L) 07/25/2022    GLUCOSE 101 (H) 07/25/2022    CALCIUM 10.0 07/25/2022    BILITOT 0.6 07/25/2022    ALKPHOS 55 07/25/2022    AST 24 07/25/2022    ALT 33 07/25/2022    LABGLOM >60 07/25/2022    GFRAA >60 07/25/2022    AGRATIO 1.6 07/25/2022

## 2024-11-26 SDOH — ECONOMIC STABILITY: INCOME INSECURITY: HOW HARD IS IT FOR YOU TO PAY FOR THE VERY BASICS LIKE FOOD, HOUSING, MEDICAL CARE, AND HEATING?: SOMEWHAT HARD

## 2024-11-26 SDOH — ECONOMIC STABILITY: FOOD INSECURITY: WITHIN THE PAST 12 MONTHS, YOU WORRIED THAT YOUR FOOD WOULD RUN OUT BEFORE YOU GOT MONEY TO BUY MORE.: NEVER TRUE

## 2024-11-26 SDOH — ECONOMIC STABILITY: FOOD INSECURITY: WITHIN THE PAST 12 MONTHS, THE FOOD YOU BOUGHT JUST DIDN'T LAST AND YOU DIDN'T HAVE MONEY TO GET MORE.: NEVER TRUE

## 2024-11-29 ENCOUNTER — OFFICE VISIT (OUTPATIENT)
Dept: FAMILY MEDICINE CLINIC | Age: 44
End: 2024-11-29

## 2024-11-29 VITALS
WEIGHT: 263 LBS | SYSTOLIC BLOOD PRESSURE: 145 MMHG | OXYGEN SATURATION: 96 % | HEART RATE: 74 BPM | RESPIRATION RATE: 16 BRPM | BODY MASS INDEX: 41.28 KG/M2 | DIASTOLIC BLOOD PRESSURE: 100 MMHG | TEMPERATURE: 98 F | HEIGHT: 67 IN

## 2024-11-29 DIAGNOSIS — R73.03 PREDIABETES: ICD-10-CM

## 2024-11-29 DIAGNOSIS — Z00.00 ANNUAL PHYSICAL EXAM: Primary | ICD-10-CM

## 2024-11-29 DIAGNOSIS — I10 HYPERTENSION, ESSENTIAL: ICD-10-CM

## 2024-11-29 DIAGNOSIS — Z12.11 COLON CANCER SCREENING: ICD-10-CM

## 2024-11-29 NOTE — PROGRESS NOTES
[facility] Medicine  Clinic Note    Date: 11/29/2024                                               /Objective:     Chief Complaint   Patient presents with    Hypertension       HPI  Patient is here for annual exam.  Last Tdap 2022.  Has never done colon cancer screening.    Patient has history of prediabetes.  Denies polyuria/polydipsia.    Patient has history of hypertension.  Currently takes losartan and triamterene and HCTZ.  Denies chest pain or shortness of breath.    Patient feels well and has no complaints.         Patient Active Problem List    Diagnosis Date Noted    Hypertension 01/22/2013       Past Medical History:   Diagnosis Date    ADHD (attention deficit hyperactivity disorder) 1985    I was diagnosed as a child    Hypertension 01/22/2013       Current Outpatient Medications   Medication Sig Dispense Refill    losartan (COZAAR) 50 MG tablet TAKE 1 TABLET BY MOUTH EVERY DAY 90 tablet 1    triamterene-hydroCHLOROthiazide (MAXZIDE) 75-50 MG per tablet TAKE 1 TABLET BY MOUTH EVERY DAY 90 tablet 1     No current facility-administered medications for this visit.       Allergies   Allergen Reactions    Lisinopril Cough       Review of Systems  No CP, no SOB, no rash, no bruise, no HA, no vision change, no ankle swelling, no hearing problems, no LAD      Vitals:  BP (!) 145/100   Pulse 74   Temp 98 °F (36.7 °C)   Resp 16   Ht 1.702 m (5' 7\")   Wt 119.3 kg (263 lb)   SpO2 96%   BMI 41.19 kg/m²     Wt Readings from Last 3 Encounters:   11/29/24 119.3 kg (263 lb)   05/28/24 119.4 kg (263 lb 3.2 oz)   11/10/23 119.3 kg (263 lb)        Physical Exam  General:  Well-appearing, NAD, alert, non-toxic  HEENT:  Normocephalic, atraumatic. Pupils equal and round. TMs pearly with good landmarks. Moist mucous membranes. Normal dentition  NECK:  Supple, normal range of motion, no LAD, no meningeal signs, no JVD, nontender  CHEST/LUNGS: CTAB, no crackles, no wheeze, no rhonchi. Symmetric rise  CARDIOVASCULAR:

## 2024-12-12 DIAGNOSIS — Z00.00 ANNUAL PHYSICAL EXAM: ICD-10-CM

## 2024-12-12 DIAGNOSIS — R73.03 PREDIABETES: ICD-10-CM

## 2024-12-12 LAB
ALBUMIN SERPL-MCNC: 4.8 G/DL (ref 3.4–5)
ALBUMIN/GLOB SERPL: 1.8 {RATIO} (ref 1.1–2.2)
ALP SERPL-CCNC: 53 U/L (ref 40–129)
ALT SERPL-CCNC: 60 U/L (ref 10–40)
ANION GAP SERPL CALCULATED.3IONS-SCNC: 11 MMOL/L (ref 3–16)
AST SERPL-CCNC: 37 U/L (ref 15–37)
BASOPHILS # BLD: 0.1 K/UL (ref 0–0.2)
BASOPHILS NFR BLD: 1 %
BILIRUB SERPL-MCNC: 0.4 MG/DL (ref 0–1)
BUN SERPL-MCNC: 14 MG/DL (ref 7–20)
CALCIUM SERPL-MCNC: 10.4 MG/DL (ref 8.3–10.6)
CHLORIDE SERPL-SCNC: 101 MMOL/L (ref 99–110)
CHOLEST SERPL-MCNC: 248 MG/DL (ref 0–199)
CO2 SERPL-SCNC: 27 MMOL/L (ref 21–32)
CREAT SERPL-MCNC: 0.8 MG/DL (ref 0.9–1.3)
DEPRECATED RDW RBC AUTO: 14.5 % (ref 12.4–15.4)
EOSINOPHIL # BLD: 0.1 K/UL (ref 0–0.6)
EOSINOPHIL NFR BLD: 1.5 %
GFR SERPLBLD CREATININE-BSD FMLA CKD-EPI: >90 ML/MIN/{1.73_M2}
GLUCOSE SERPL-MCNC: 87 MG/DL (ref 70–99)
HCT VFR BLD AUTO: 45.1 % (ref 40.5–52.5)
HDLC SERPL-MCNC: 44 MG/DL (ref 40–60)
HGB BLD-MCNC: 15.1 G/DL (ref 13.5–17.5)
LDL CHOLESTEROL: ABNORMAL MG/DL
LDLC SERPL-MCNC: 138 MG/DL
LYMPHOCYTES # BLD: 1.9 K/UL (ref 1–5.1)
LYMPHOCYTES NFR BLD: 29.6 %
MCH RBC QN AUTO: 28.3 PG (ref 26–34)
MCHC RBC AUTO-ENTMCNC: 33.4 G/DL (ref 31–36)
MCV RBC AUTO: 84.5 FL (ref 80–100)
MONOCYTES # BLD: 0.6 K/UL (ref 0–1.3)
MONOCYTES NFR BLD: 9.2 %
NEUTROPHILS # BLD: 3.8 K/UL (ref 1.7–7.7)
NEUTROPHILS NFR BLD: 58.7 %
PLATELET # BLD AUTO: 262 K/UL (ref 135–450)
PMV BLD AUTO: 9.7 FL (ref 5–10.5)
POTASSIUM SERPL-SCNC: 4.5 MMOL/L (ref 3.5–5.1)
PROT SERPL-MCNC: 7.4 G/DL (ref 6.4–8.2)
RBC # BLD AUTO: 5.34 M/UL (ref 4.2–5.9)
SODIUM SERPL-SCNC: 139 MMOL/L (ref 136–145)
TRIGL SERPL-MCNC: 460 MG/DL (ref 0–150)
VLDLC SERPL CALC-MCNC: ABNORMAL MG/DL
WBC # BLD AUTO: 6.5 K/UL (ref 4–11)

## 2024-12-13 LAB
EST. AVERAGE GLUCOSE BLD GHB EST-MCNC: 122.6 MG/DL
HBA1C MFR BLD: 5.9 %

## 2024-12-15 DIAGNOSIS — I10 HYPERTENSION, ESSENTIAL: ICD-10-CM

## 2024-12-16 RX ORDER — TRIAMTERENE AND HYDROCHLOROTHIAZIDE 75; 50 MG/1; MG/1
1 TABLET ORAL DAILY
Qty: 90 TABLET | Refills: 1 | Status: SHIPPED | OUTPATIENT
Start: 2024-12-16

## 2024-12-16 NOTE — TELEPHONE ENCOUNTER
Medication:   Requested Prescriptions     Pending Prescriptions Disp Refills    triamterene-hydroCHLOROthiazide (MAXZIDE) 75-50 MG per tablet [Pharmacy Med Name: TRIAMTERENE-HCTZ 75-50 MG TAB] 90 tablet 1     Sig: TAKE 1 TABLET BY MOUTH EVERY DAY       Last Filled:  06/11/2024    Patient Phone Number: [phone] (home)     Last appt: 11/29/2024   Next appt: 5/30/2025    Lab Results   Component Value Date     12/12/2024    K 4.5 12/12/2024     12/12/2024    CO2 27 12/12/2024    BUN 14 12/12/2024    CREATININE 0.8 (L) 12/12/2024    GLUCOSE 87 12/12/2024    CALCIUM 10.4 12/12/2024    BILITOT 0.4 12/12/2024    ALKPHOS 53 12/12/2024    AST 37 12/12/2024    ALT 60 (H) 12/12/2024    LABGLOM >90 12/12/2024    GFRAA >60 07/25/2022    AGRATIO 1.8 12/12/2024

## 2024-12-21 LAB — NONINV COLON CA DNA+OCC BLD SCRN STL QL: NEGATIVE

## 2025-02-09 DIAGNOSIS — I10 HYPERTENSION, ESSENTIAL: ICD-10-CM

## 2025-02-10 RX ORDER — LOSARTAN POTASSIUM 50 MG/1
TABLET ORAL
Qty: 90 TABLET | Refills: 1 | Status: SHIPPED | OUTPATIENT
Start: 2025-02-10

## 2025-02-10 NOTE — TELEPHONE ENCOUNTER
Medication:   Requested Prescriptions     Pending Prescriptions Disp Refills    losartan (COZAAR) 50 MG tablet [Pharmacy Med Name: LOSARTAN POTASSIUM 50 MG TAB] 90 tablet 1     Sig: TAKE 1 TABLET BY MOUTH EVERY DAY       Last Filled:  08/05/2024    Patient Phone Number: [phone] (home)     Last appt: 11/29/2024   Next appt: 5/30/2025    Lab Results   Component Value Date     12/12/2024    K 4.5 12/12/2024     12/12/2024    CO2 27 12/12/2024    BUN 14 12/12/2024    CREATININE 0.8 (L) 12/12/2024    GLUCOSE 87 12/12/2024    CALCIUM 10.4 12/12/2024    BILITOT 0.4 12/12/2024    ALKPHOS 53 12/12/2024    AST 37 12/12/2024    ALT 60 (H) 12/12/2024    LABGLOM >90 12/12/2024    GFRAA >60 07/25/2022    AGRATIO 1.8 12/12/2024

## 2025-04-02 SDOH — ECONOMIC STABILITY: FOOD INSECURITY: WITHIN THE PAST 12 MONTHS, THE FOOD YOU BOUGHT JUST DIDN'T LAST AND YOU DIDN'T HAVE MONEY TO GET MORE.: NEVER TRUE

## 2025-04-02 SDOH — ECONOMIC STABILITY: FOOD INSECURITY: WITHIN THE PAST 12 MONTHS, YOU WORRIED THAT YOUR FOOD WOULD RUN OUT BEFORE YOU GOT MONEY TO BUY MORE.: NEVER TRUE

## 2025-04-02 SDOH — ECONOMIC STABILITY: INCOME INSECURITY: IN THE LAST 12 MONTHS, WAS THERE A TIME WHEN YOU WERE NOT ABLE TO PAY THE MORTGAGE OR RENT ON TIME?: NO

## 2025-04-02 SDOH — ECONOMIC STABILITY: TRANSPORTATION INSECURITY
IN THE PAST 12 MONTHS, HAS THE LACK OF TRANSPORTATION KEPT YOU FROM MEDICAL APPOINTMENTS OR FROM GETTING MEDICATIONS?: NO

## 2025-04-02 ASSESSMENT — PATIENT HEALTH QUESTIONNAIRE - PHQ9
1. LITTLE INTEREST OR PLEASURE IN DOING THINGS: NOT AT ALL
SUM OF ALL RESPONSES TO PHQ9 QUESTIONS 1 & 2: 0
SUM OF ALL RESPONSES TO PHQ QUESTIONS 1-9: 0
2. FEELING DOWN, DEPRESSED OR HOPELESS: NOT AT ALL
SUM OF ALL RESPONSES TO PHQ QUESTIONS 1-9: 0
SUM OF ALL RESPONSES TO PHQ QUESTIONS 1-9: 0
1. LITTLE INTEREST OR PLEASURE IN DOING THINGS: NOT AT ALL
2. FEELING DOWN, DEPRESSED OR HOPELESS: NOT AT ALL
SUM OF ALL RESPONSES TO PHQ QUESTIONS 1-9: 0

## 2025-04-04 ENCOUNTER — OFFICE VISIT (OUTPATIENT)
Dept: FAMILY MEDICINE CLINIC | Age: 45
End: 2025-04-04

## 2025-04-04 VITALS
SYSTOLIC BLOOD PRESSURE: 138 MMHG | WEIGHT: 261 LBS | HEART RATE: 75 BPM | HEIGHT: 67 IN | BODY MASS INDEX: 40.97 KG/M2 | TEMPERATURE: 97.7 F | OXYGEN SATURATION: 99 % | DIASTOLIC BLOOD PRESSURE: 86 MMHG

## 2025-04-04 DIAGNOSIS — Z23 NEED FOR VACCINATION: Primary | ICD-10-CM

## 2025-04-04 DIAGNOSIS — E66.813 CLASS 3 SEVERE OBESITY DUE TO EXCESS CALORIES WITHOUT SERIOUS COMORBIDITY WITH BODY MASS INDEX (BMI) OF 40.0 TO 44.9 IN ADULT: ICD-10-CM

## 2025-04-04 DIAGNOSIS — E66.01 CLASS 3 SEVERE OBESITY DUE TO EXCESS CALORIES WITHOUT SERIOUS COMORBIDITY WITH BODY MASS INDEX (BMI) OF 40.0 TO 44.9 IN ADULT: ICD-10-CM

## 2025-04-04 NOTE — PROGRESS NOTES
[facility] Medicine  Clinic Note    Date: 4/4/2025                                               /Objective:     Chief Complaint   Patient presents with    Weight Loss     Would like to discuss options for weight loss       HPI  Patient is here to discuss weight loss medicines. Has tried exercise recently and decreased his portion sizes but has not lost weight. Would like to try Mounjaro.         Patient Active Problem List    Diagnosis Date Noted    Hypertension 01/22/2013       Past Medical History:   Diagnosis Date    ADHD (attention deficit hyperactivity disorder) 1985    I was diagnosed as a child    Hypertension 01/22/2013       Current Outpatient Medications   Medication Sig Dispense Refill    Tirzepatide 2.5 MG/0.5ML SOAJ Inject 2.5 mg into the skin every 7 days 2 mL 0    losartan (COZAAR) 50 MG tablet TAKE 1 TABLET BY MOUTH EVERY DAY 90 tablet 1    triamterene-hydroCHLOROthiazide (MAXZIDE) 75-50 MG per tablet TAKE 1 TABLET BY MOUTH EVERY DAY 90 tablet 1     No current facility-administered medications for this visit.       Allergies   Allergen Reactions    Lisinopril Cough       Review of Systems  No knee pain, no fever    Vitals:  /86   Pulse 75   Temp 97.7 °F (36.5 °C)   Ht 1.702 m (5' 7\")   Wt 118.4 kg (261 lb)   SpO2 99%   BMI 40.88 kg/m²     Wt Readings from Last 3 Encounters:   04/04/25 118.4 kg (261 lb)   11/29/24 119.3 kg (263 lb)   05/28/24 119.4 kg (263 lb 3.2 oz)        Physical Exam  General:  Well-appearing, NAD, alert, non-toxic  HEENT:  Normocephalic, atraumatic.   CHEST/LUNGS: No dyspnea  EXTREMETIES: Normal movement of all extremities. No edema. No joint swelling.  SKIN:  No rash, no cellulitis, no bruising, no petechiae/purpura/vesicles/pustules/abscess  PSYCH:  A+O x 3; normal affect  NEURO:  GCS 15, CN2-12 grossly intact, no focal motor/sensory deficits, normal gait, normal speech      Assessment/Plan     45-year-old male with obesity.  Has failed diet and exercise.

## 2025-04-07 ENCOUNTER — TELEPHONE (OUTPATIENT)
Dept: ADMINISTRATIVE | Age: 45
End: 2025-04-07

## 2025-04-07 NOTE — TELEPHONE ENCOUNTER
Submitted PA for Mounjaro 2.5MG/0.5ML auto-injectors  Via [facility] [id number] STATUS: PENDING.    Follow up done daily; if no decision with in three days we will refax.  If another three days goes by with no decision will call the insurance for status.

## 2025-04-09 NOTE — TELEPHONE ENCOUNTER
The medication was DENIED; DENIAL letter is uploaded to [employer].    Other; please see Denial Letter.     Note :        If you want an APPEAL; please note in this encounter what new information you would like to APPEAL with.  Once complete route back to PA POOL.    If this requires a response please respond to the pool ( P [id number] MEDICATION PRE-AUTH).      Thank you please advise patient.

## 2025-04-16 DIAGNOSIS — I10 HYPERTENSION, ESSENTIAL: ICD-10-CM

## 2025-04-16 RX ORDER — TRIAMTERENE AND HYDROCHLOROTHIAZIDE 75; 50 MG/1; MG/1
1 TABLET ORAL DAILY
Qty: 90 TABLET | Refills: 1 | Status: SHIPPED | OUTPATIENT
Start: 2025-04-16

## 2025-04-16 NOTE — TELEPHONE ENCOUNTER
Medication:   Requested Prescriptions     Pending Prescriptions Disp Refills    triamterene-hydroCHLOROthiazide (MAXZIDE) 75-50 MG per tablet [Pharmacy Med Name: TRIAMTERENE-HCTZ 75-50 MG TAB] 90 tablet 1     Sig: TAKE 1 TABLET BY MOUTH EVERY DAY       Last Filled:  12/16/24 90 tabs 1 refill     Patient Phone Number: [phone] (home)     Last appt: 4/4/2025   Next appt: 5/30/2025    Lab Results   Component Value Date     12/12/2024    K 4.5 12/12/2024     12/12/2024    CO2 27 12/12/2024    BUN 14 12/12/2024    CREATININE 0.8 (L) 12/12/2024    GLUCOSE 87 12/12/2024    CALCIUM 10.4 12/12/2024    BILITOT 0.4 12/12/2024    ALKPHOS 53 12/12/2024    AST 37 12/12/2024    ALT 60 (H) 12/12/2024    LABGLOM >90 12/12/2024    GFRAA >60 07/25/2022    AGRATIO 1.8 12/12/2024